# Patient Record
Sex: MALE | Race: WHITE | NOT HISPANIC OR LATINO | URBAN - METROPOLITAN AREA
[De-identification: names, ages, dates, MRNs, and addresses within clinical notes are randomized per-mention and may not be internally consistent; named-entity substitution may affect disease eponyms.]

---

## 2020-07-25 ENCOUNTER — TELEPHONE ENCOUNTER (OUTPATIENT)
Dept: URBAN - METROPOLITAN AREA CLINIC 13 | Facility: CLINIC | Age: 65
End: 2020-07-25

## 2020-07-26 ENCOUNTER — TELEPHONE ENCOUNTER (OUTPATIENT)
Dept: URBAN - METROPOLITAN AREA CLINIC 13 | Facility: CLINIC | Age: 65
End: 2020-07-26

## 2020-07-26 RX ORDER — PREDNISONE 20 MG/1
TABLET ORAL
Qty: 7 | Refills: 0 | Status: ACTIVE | COMMUNITY
Start: 2019-05-09

## 2020-07-26 RX ORDER — ADALIMUMAB 40MG/0.4ML
KIT SUBCUTANEOUS
Qty: 2 | Refills: 0 | Status: ACTIVE | COMMUNITY
Start: 2019-08-13

## 2020-07-26 RX ORDER — LISINOPRIL 10 MG/1
TAKE 1 TABLET DAILY FOR BLOOD PRESSURE TABLET ORAL
Refills: 0 | Status: ACTIVE | COMMUNITY

## 2020-07-26 RX ORDER — ADALIMUMAB 40MG/0.8ML
KIT SUBCUTANEOUS
Qty: 6 | Refills: 0 | Status: ACTIVE | COMMUNITY
Start: 2019-03-19

## 2020-07-26 RX ORDER — CIPROFLOXACIN AND DEXAMETHASONE 3; 1 MG/ML; MG/ML
SUSPENSION/ DROPS AURICULAR (OTIC)
Qty: 8 | Refills: 0 | Status: ACTIVE | COMMUNITY
Start: 2018-06-15

## 2020-07-26 RX ORDER — ADALIMUMAB 40MG/0.8ML
INJECT 40 MG SUBCUTANEOUSLY EVERY OTHER WEEK AS DIRECTED KIT SUBCUTANEOUS
Refills: 6 | Status: ACTIVE | COMMUNITY

## 2020-07-26 RX ORDER — CLOTRIMAZOLE 1 G/ML
SOLUTION TOPICAL
Qty: 30 | Refills: 0 | Status: ACTIVE | COMMUNITY
Start: 2019-08-26

## 2020-07-26 RX ORDER — METHOTREXATE 2.5 MG/1
TAKE 7 TABLETS PER WEEK TABLET ORAL
Refills: 0 | Status: ACTIVE | COMMUNITY

## 2020-07-26 RX ORDER — URSODIOL 300 MG/1
TAKE 1 CAPSULE 2-3 TIMES   DAILY CAPSULE ORAL
Qty: 90 | Refills: 1 | Status: ACTIVE | COMMUNITY
Start: 2019-09-23

## 2020-07-26 RX ORDER — ADALIMUMAB 40MG/0.8ML
KIT SUBCUTANEOUS
Qty: 6 | Refills: 0 | Status: ACTIVE | COMMUNITY
Start: 2018-11-25

## 2020-07-26 RX ORDER — MELOXICAM 15 MG/1
TAKE 1 TABLET DAILY AS NEEDED TABLET ORAL
Refills: 0 | Status: ACTIVE | COMMUNITY

## 2020-07-26 RX ORDER — FOLIC ACID 1 MG/1
TAKE 1 TABLET DAILY AS DIRECTED TABLET ORAL
Qty: 90 | Refills: 3 | Status: ACTIVE | COMMUNITY

## 2020-09-14 ENCOUNTER — ERX REFILL RESPONSE (OUTPATIENT)
Age: 65
End: 2020-09-14

## 2020-09-14 RX ORDER — URSODIOL 300 MG/1
TAKE 1 CAPSULE 2-3 TIMES   DAILY CAPSULE ORAL
Qty: 90 | Refills: 1

## 2020-11-07 ENCOUNTER — ERX REFILL RESPONSE (OUTPATIENT)
Age: 65
End: 2020-11-07

## 2020-11-07 RX ORDER — URSODIOL 300 MG/1
TAKE 1 CAPSULE 2-3 TIMES   DAILY CAPSULE ORAL
Qty: 90 | Refills: 1

## 2021-01-06 ENCOUNTER — ERX REFILL RESPONSE (OUTPATIENT)
Age: 66
End: 2021-01-06

## 2021-01-06 RX ORDER — URSODIOL 300 MG/1
TAKE 1 CAPSULE 2-3 TIMES   DAILY CAPSULE ORAL
Qty: 90 | Refills: 1

## 2021-06-26 ENCOUNTER — ERX REFILL RESPONSE (OUTPATIENT)
Dept: URBAN - METROPOLITAN AREA CLINIC 107 | Facility: CLINIC | Age: 66
End: 2021-06-26

## 2021-06-26 RX ORDER — URSODIOL 300 MG/1
TAKE 1 CAPSULE 2-3 TIMES   DAILY CAPSULE ORAL
Qty: 90 CAPSULE | Refills: 2 | OUTPATIENT

## 2021-08-06 ENCOUNTER — ERX REFILL RESPONSE (OUTPATIENT)
Dept: URBAN - METROPOLITAN AREA CLINIC 107 | Facility: CLINIC | Age: 66
End: 2021-08-06

## 2021-08-06 RX ORDER — URSODIOL 300 MG/1
TAKE 1 CAPSULE 2-3 TIMES   DAILY CAPSULE ORAL
Qty: 90 CAPSULE | Refills: 2 | OUTPATIENT

## 2021-09-20 ENCOUNTER — ERX REFILL RESPONSE (OUTPATIENT)
Dept: URBAN - METROPOLITAN AREA CLINIC 107 | Facility: CLINIC | Age: 66
End: 2021-09-20

## 2021-09-20 RX ORDER — URSODIOL 300 MG/1
TAKE 1 CAPSULE 2-3 TIMES   DAILY CAPSULE ORAL
Qty: 90 CAPSULE | Refills: 2 | OUTPATIENT

## 2021-11-08 ENCOUNTER — ERX REFILL RESPONSE (OUTPATIENT)
Dept: URBAN - METROPOLITAN AREA CLINIC 107 | Facility: CLINIC | Age: 66
End: 2021-11-08

## 2021-11-08 RX ORDER — URSODIOL 300 MG/1
TAKE 1 CAPSULE 2-3 TIMES   DAILY CAPSULE ORAL
Qty: 90 CAPSULE | Refills: 2 | OUTPATIENT

## 2021-12-22 ENCOUNTER — ERX REFILL RESPONSE (OUTPATIENT)
Dept: URBAN - METROPOLITAN AREA CLINIC 107 | Facility: CLINIC | Age: 66
End: 2021-12-22

## 2021-12-22 RX ORDER — URSODIOL 300 MG/1
TAKE 1 CAPSULE 2-3 TIMES   DAILY CAPSULE ORAL
Qty: 90 CAPSULE | Refills: 2 | OUTPATIENT

## 2022-06-13 ENCOUNTER — ERX REFILL RESPONSE (OUTPATIENT)
Dept: URBAN - METROPOLITAN AREA CLINIC 107 | Facility: CLINIC | Age: 67
End: 2022-06-13

## 2022-06-13 RX ORDER — URSODIOL 300 MG/1
TAKE 1 CAPSULE 2-3 TIMES   DAILY CAPSULE ORAL
Qty: 90 CAPSULE | Refills: 1 | OUTPATIENT

## 2022-06-13 RX ORDER — URSODIOL 300 MG/1
TAKE 1 CAPSULE 2-3 TIMES   DAILY CAPSULE ORAL
Qty: 90 CAPSULE | Refills: 2 | OUTPATIENT

## 2022-08-29 ENCOUNTER — ERX REFILL RESPONSE (OUTPATIENT)
Dept: URBAN - METROPOLITAN AREA CLINIC 107 | Facility: CLINIC | Age: 67
End: 2022-08-29

## 2022-08-29 RX ORDER — URSODIOL 300 MG/1
TAKE 1 CAPSULE 2-3 TIMES   DAILY CAPSULE ORAL
Qty: 90 CAPSULE | Refills: 1 | OUTPATIENT

## 2022-10-17 ENCOUNTER — ERX REFILL RESPONSE (OUTPATIENT)
Dept: URBAN - METROPOLITAN AREA CLINIC 107 | Facility: CLINIC | Age: 67
End: 2022-10-17

## 2022-10-17 RX ORDER — URSODIOL 300 MG/1
TAKE 1 CAPSULE 2-3 TIMES   DAILY CAPSULE ORAL
Qty: 90 CAPSULE | Refills: 1 | OUTPATIENT

## 2022-10-25 ENCOUNTER — WEB ENCOUNTER (OUTPATIENT)
Dept: URBAN - METROPOLITAN AREA CLINIC 113 | Facility: CLINIC | Age: 67
End: 2022-10-25

## 2022-10-28 ENCOUNTER — OFFICE VISIT (OUTPATIENT)
Dept: URBAN - METROPOLITAN AREA CLINIC 113 | Facility: CLINIC | Age: 67
End: 2022-10-28
Payer: MEDICARE

## 2022-10-28 ENCOUNTER — LAB OUTSIDE AN ENCOUNTER (OUTPATIENT)
Dept: URBAN - METROPOLITAN AREA CLINIC 113 | Facility: CLINIC | Age: 67
End: 2022-10-28

## 2022-10-28 VITALS
SYSTOLIC BLOOD PRESSURE: 143 MMHG | HEART RATE: 71 BPM | WEIGHT: 171 LBS | BODY MASS INDEX: 21.26 KG/M2 | DIASTOLIC BLOOD PRESSURE: 88 MMHG | TEMPERATURE: 97.3 F | RESPIRATION RATE: 20 BRPM | HEIGHT: 75 IN

## 2022-10-28 DIAGNOSIS — Z86.010 HISTORY OF ADENOMATOUS POLYP OF COLON: ICD-10-CM

## 2022-10-28 DIAGNOSIS — K80.20 CALCULUS OF GALLBLADDER WITHOUT CHOLECYSTITIS WITHOUT OBSTRUCTION: ICD-10-CM

## 2022-10-28 DIAGNOSIS — Z86.19 HISTORY OF HEPATITIS C: ICD-10-CM

## 2022-10-28 PROBLEM — 70342003: Status: ACTIVE | Noted: 2022-10-28

## 2022-10-28 PROCEDURE — 99213 OFFICE O/P EST LOW 20 MIN: CPT | Performed by: NURSE PRACTITIONER

## 2022-10-28 RX ORDER — URSODIOL 300 MG/1
TAKE 1 CAPSULE 2-3 TIMES   DAILY CAPSULE ORAL
Qty: 90 CAPSULE | Refills: 1 | Status: ACTIVE | COMMUNITY

## 2022-10-28 RX ORDER — LISINOPRIL 10 MG/1
TAKE 1 TABLET DAILY FOR BLOOD PRESSURE TABLET ORAL
Refills: 0 | Status: ACTIVE | COMMUNITY

## 2022-10-28 RX ORDER — CLOTRIMAZOLE 1 G/ML
SOLUTION TOPICAL
Qty: 30 | Refills: 0 | Status: DISCONTINUED | COMMUNITY
Start: 2019-08-26

## 2022-10-28 RX ORDER — ADALIMUMAB 40MG/0.4ML
KIT SUBCUTANEOUS
Qty: 2 | Refills: 0 | Status: DISCONTINUED | COMMUNITY
Start: 2019-08-13

## 2022-10-28 RX ORDER — METHOTREXATE 2.5 MG/1
TAKE 7 TABLETS PER WEEK TABLET ORAL
Refills: 0 | Status: ACTIVE | COMMUNITY

## 2022-10-28 RX ORDER — ADALIMUMAB 40MG/0.8ML
KIT SUBCUTANEOUS
Qty: 6 | Refills: 0 | Status: DISCONTINUED | COMMUNITY
Start: 2018-11-25

## 2022-10-28 RX ORDER — LATANOPROST 50 UG/ML
1 DROP INTO AFFECTED EYE IN THE EVENING SOLUTION/ DROPS OPHTHALMIC ONCE A DAY
Status: ACTIVE | COMMUNITY

## 2022-10-28 RX ORDER — MELOXICAM 15 MG/1
TAKE 1 TABLET DAILY AS NEEDED TABLET ORAL
Refills: 0 | Status: ACTIVE | COMMUNITY

## 2022-10-28 RX ORDER — INFLIXIMAB 100 MG/1
AS DIRECTED INJECTION, POWDER, LYOPHILIZED, FOR SOLUTION INTRAVENOUS
Status: ACTIVE | COMMUNITY

## 2022-10-28 RX ORDER — FOLIC ACID 1 MG/1
TAKE 1 TABLET DAILY AS DIRECTED TABLET ORAL
Qty: 90 | Refills: 3 | Status: ACTIVE | COMMUNITY

## 2022-10-28 RX ORDER — URSODIOL 300 MG/1
TAKE 1 CAPSULE 2-3 TIMES   DAILY CAPSULE ORAL
Qty: 90 | Refills: 1 | Status: DISCONTINUED | COMMUNITY
Start: 2019-09-23

## 2022-10-28 RX ORDER — BRIMONIDINE TARTRATE, TIMOLOL MALEATE 2; 5 MG/ML; MG/ML
1 DROP INTO AFFECTED EYE SOLUTION/ DROPS OPHTHALMIC TWICE A DAY
Status: ACTIVE | COMMUNITY

## 2022-10-28 RX ORDER — ADALIMUMAB 40MG/0.8ML
INJECT 40 MG SUBCUTANEOUSLY EVERY OTHER WEEK AS DIRECTED KIT SUBCUTANEOUS
Refills: 6 | Status: DISCONTINUED | COMMUNITY

## 2022-10-28 RX ORDER — PREDNISONE 20 MG/1
TABLET ORAL
Qty: 7 | Refills: 0 | Status: DISCONTINUED | COMMUNITY
Start: 2019-05-09

## 2022-10-28 RX ORDER — CIPROFLOXACIN AND DEXAMETHASONE 3; 1 MG/ML; MG/ML
SUSPENSION/ DROPS AURICULAR (OTIC)
Qty: 8 | Refills: 0 | Status: DISCONTINUED | COMMUNITY
Start: 2018-06-15

## 2022-10-28 RX ORDER — ADALIMUMAB 10MG/0.2ML
INJECT 0.2 MILLILITER BY SUBCUTANEOUS ROUTE KIT SUBCUTANEOUS
Qty: 1 | Refills: 0 | Status: DISCONTINUED | COMMUNITY
Start: 1900-01-01

## 2022-10-28 NOTE — HPI-TODAY'S VISIT:
This is a 67-year-old male with history of hypertension, psoriasis/psoriatic arthritis on methotrexate and Renflexis, cholelithiasis on ursodiol, hepatitis C status post treatment with Sovaldi and ribavirin in 2015 with sustained viral response, and adenomatous colon polyps due surveillance presenting for follow-up regarding hepatitis C and to schedule a surveillance colonoscopy. He was last seen in the office 11/14/2019.  He had undergone labs in April of that year showing a normal CBC, CMP, alpha-fetoprotein (2.3), and negative hepatitis C viral RNA.  He was to continue ursodiol 300 mg twice a day.  He had an ultrasound in April 2020 showing mild hepatomegaly and cholelithiasis without sonographic evidence of acute cholecystitis. He was doing well.  He infrequently has diarrhea but last less than a day.  He denies any other abdominal symptoms.

## 2022-10-28 NOTE — HPI-OTHER HISTORIES
Labs 10/17/2022:CBC: WBC 6.4, hemoglobin 13.3, MCV 99, platelet 248.  BMP normal.  LFTs normal TB 0.3, ALP 47, ALT 13, AST 18.  Hemoglobin A1c 5.6.  Cholesterol panel normal.

## 2022-10-31 LAB — AFP, SERUM, TUMOR MARKER: 3.3

## 2022-11-24 ENCOUNTER — ERX REFILL RESPONSE (OUTPATIENT)
Dept: URBAN - METROPOLITAN AREA CLINIC 107 | Facility: CLINIC | Age: 67
End: 2022-11-24

## 2022-11-24 RX ORDER — URSODIOL 300 MG/1
TAKE 1 CAPSULE 2-3 TIMES   DAILY CAPSULE ORAL
Qty: 90 CAPSULE | Refills: 1 | OUTPATIENT

## 2022-12-29 ENCOUNTER — CLAIMS CREATED FROM THE CLAIM WINDOW (OUTPATIENT)
Dept: URBAN - METROPOLITAN AREA CLINIC 4 | Facility: CLINIC | Age: 67
End: 2022-12-29
Payer: MEDICARE

## 2022-12-29 ENCOUNTER — OFFICE VISIT (OUTPATIENT)
Dept: URBAN - METROPOLITAN AREA SURGERY CENTER 25 | Facility: SURGERY CENTER | Age: 67
End: 2022-12-29
Payer: MEDICARE

## 2022-12-29 DIAGNOSIS — Z86.010 HISTORY OF ADENOMATOUS POLYP OF COLON: ICD-10-CM

## 2022-12-29 DIAGNOSIS — D12.3 ADENOMA OF TRANSVERSE COLON: ICD-10-CM

## 2022-12-29 DIAGNOSIS — D12.3 BENIGN NEOPLASM OF TRANSVERSE COLON: ICD-10-CM

## 2022-12-29 PROCEDURE — 88305 TISSUE EXAM BY PATHOLOGIST: CPT | Performed by: PATHOLOGY

## 2022-12-29 PROCEDURE — 45385 COLONOSCOPY W/LESION REMOVAL: CPT | Performed by: INTERNAL MEDICINE

## 2022-12-29 PROCEDURE — G8907 PT DOC NO EVENTS ON DISCHARG: HCPCS | Performed by: INTERNAL MEDICINE

## 2022-12-29 RX ORDER — MELOXICAM 15 MG/1
TAKE 1 TABLET DAILY AS NEEDED TABLET ORAL
Refills: 0 | Status: ACTIVE | COMMUNITY

## 2022-12-29 RX ORDER — LATANOPROST 50 UG/ML
1 DROP INTO AFFECTED EYE IN THE EVENING SOLUTION/ DROPS OPHTHALMIC ONCE A DAY
Status: ACTIVE | COMMUNITY

## 2022-12-29 RX ORDER — BRIMONIDINE TARTRATE, TIMOLOL MALEATE 2; 5 MG/ML; MG/ML
1 DROP INTO AFFECTED EYE SOLUTION/ DROPS OPHTHALMIC TWICE A DAY
Status: ACTIVE | COMMUNITY

## 2022-12-29 RX ORDER — METHOTREXATE 2.5 MG/1
TAKE 7 TABLETS PER WEEK TABLET ORAL
Refills: 0 | Status: ACTIVE | COMMUNITY

## 2022-12-29 RX ORDER — URSODIOL 300 MG/1
TAKE 1 CAPSULE 2-3 TIMES   DAILY CAPSULE ORAL
Qty: 90 CAPSULE | Refills: 1 | Status: ACTIVE | COMMUNITY

## 2022-12-29 RX ORDER — FOLIC ACID 1 MG/1
TAKE 1 TABLET DAILY AS DIRECTED TABLET ORAL
Qty: 90 | Refills: 3 | Status: ACTIVE | COMMUNITY

## 2022-12-29 RX ORDER — LISINOPRIL 10 MG/1
TAKE 1 TABLET DAILY FOR BLOOD PRESSURE TABLET ORAL
Refills: 0 | Status: ACTIVE | COMMUNITY

## 2022-12-29 RX ORDER — INFLIXIMAB 100 MG/1
AS DIRECTED INJECTION, POWDER, LYOPHILIZED, FOR SOLUTION INTRAVENOUS
Status: ACTIVE | COMMUNITY

## 2023-01-21 ENCOUNTER — ERX REFILL RESPONSE (OUTPATIENT)
Dept: URBAN - METROPOLITAN AREA CLINIC 107 | Facility: CLINIC | Age: 68
End: 2023-01-21

## 2023-01-21 RX ORDER — URSODIOL 300 MG/1
TAKE 1 CAPSULE 2-3 TIMES   DAILY CAPSULE ORAL
Qty: 90 CAPSULE | Refills: 1 | OUTPATIENT

## 2023-01-23 PROBLEM — 429047008: Status: ACTIVE | Noted: 2022-10-28

## 2023-06-29 ENCOUNTER — ERX REFILL RESPONSE (OUTPATIENT)
Dept: URBAN - METROPOLITAN AREA CLINIC 107 | Facility: CLINIC | Age: 68
End: 2023-06-29

## 2023-06-29 RX ORDER — URSODIOL 300 MG/1
TAKE 1 CAPSULE 2-3 TIMES   DAILY CAPSULE ORAL
Qty: 90 CAPSULE | Refills: 1 | OUTPATIENT

## 2023-08-16 ENCOUNTER — ERX REFILL RESPONSE (OUTPATIENT)
Dept: URBAN - METROPOLITAN AREA CLINIC 107 | Facility: CLINIC | Age: 68
End: 2023-08-16

## 2023-08-16 RX ORDER — URSODIOL 300 MG/1
TAKE 1 CAPSULE 2-3 TIMES   DAILY CAPSULE ORAL
Qty: 90 CAPSULE | Refills: 1 | OUTPATIENT

## 2023-09-28 ENCOUNTER — OFFICE VISIT (OUTPATIENT)
Dept: URBAN - METROPOLITAN AREA CLINIC 113 | Facility: CLINIC | Age: 68
End: 2023-09-28

## 2023-10-08 ENCOUNTER — ERX REFILL RESPONSE (OUTPATIENT)
Dept: URBAN - METROPOLITAN AREA CLINIC 107 | Facility: CLINIC | Age: 68
End: 2023-10-08

## 2023-10-08 RX ORDER — URSODIOL 300 MG/1
TAKE 1 CAPSULE 2-3 TIMES   DAILY CAPSULE ORAL
Qty: 90 CAPSULE | Refills: 1 | OUTPATIENT

## 2023-10-09 ENCOUNTER — DASHBOARD ENCOUNTERS (OUTPATIENT)
Age: 68
End: 2023-10-09

## 2023-10-09 ENCOUNTER — OFFICE VISIT (OUTPATIENT)
Dept: URBAN - METROPOLITAN AREA CLINIC 113 | Facility: CLINIC | Age: 68
End: 2023-10-09
Payer: MEDICARE

## 2023-10-09 VITALS
DIASTOLIC BLOOD PRESSURE: 87 MMHG | TEMPERATURE: 97.7 F | HEIGHT: 75 IN | SYSTOLIC BLOOD PRESSURE: 150 MMHG | BODY MASS INDEX: 20.71 KG/M2 | WEIGHT: 166.6 LBS | HEART RATE: 75 BPM

## 2023-10-09 DIAGNOSIS — Z86.010 HISTORY OF ADENOMATOUS POLYP OF COLON: ICD-10-CM

## 2023-10-09 DIAGNOSIS — Z86.19 HISTORY OF HEPATITIS C: ICD-10-CM

## 2023-10-09 DIAGNOSIS — K80.20 CALCULUS OF GALLBLADDER WITHOUT CHOLECYSTITIS WITHOUT OBSTRUCTION: ICD-10-CM

## 2023-10-09 PROCEDURE — 99213 OFFICE O/P EST LOW 20 MIN: CPT | Performed by: INTERNAL MEDICINE

## 2023-10-09 RX ORDER — FOLIC ACID 1 MG/1
TAKE 1 TABLET DAILY AS DIRECTED TABLET ORAL
Qty: 90 | Refills: 3 | Status: ACTIVE | COMMUNITY

## 2023-10-09 RX ORDER — MELOXICAM 15 MG/1
TAKE 1 TABLET DAILY AS NEEDED TABLET ORAL
Refills: 0 | Status: ACTIVE | COMMUNITY

## 2023-10-09 RX ORDER — LATANOPROST 50 UG/ML
1 DROP INTO AFFECTED EYE IN THE EVENING SOLUTION/ DROPS OPHTHALMIC ONCE A DAY
Status: ACTIVE | COMMUNITY

## 2023-10-09 RX ORDER — LISINOPRIL 10 MG/1
TAKE 1 TABLET DAILY FOR BLOOD PRESSURE TABLET ORAL
Refills: 0 | Status: ACTIVE | COMMUNITY

## 2023-10-09 RX ORDER — METHOTREXATE 2.5 MG/1
TAKE 7 TABLETS PER WEEK TABLET ORAL
Refills: 0 | Status: ACTIVE | COMMUNITY

## 2023-10-09 RX ORDER — URSODIOL 300 MG/1
TAKE 1 CAPSULE 2-3 TIMES   DAILY CAPSULE ORAL
Qty: 90 CAPSULE | Refills: 1 | Status: ACTIVE | COMMUNITY

## 2023-10-09 RX ORDER — BRIMONIDINE TARTRATE, TIMOLOL MALEATE 2; 5 MG/ML; MG/ML
1 DROP INTO AFFECTED EYE SOLUTION/ DROPS OPHTHALMIC TWICE A DAY
Status: ACTIVE | COMMUNITY

## 2023-10-09 RX ORDER — INFLIXIMAB 100 MG/1
AS DIRECTED INJECTION, POWDER, LYOPHILIZED, FOR SOLUTION INTRAVENOUS
Status: ACTIVE | COMMUNITY

## 2023-10-09 NOTE — HPI-TODAY'S VISIT:
This is a 68-year-old male with history of hypertension, psoriasis/psoriatic arthritis on methotrexate and Renflexis, cholelithiasis on ursodiol, hepatitis C status post treatment with Sovaldi and ribavirin in 2015 with sustained viral response, and adenomatous colon polyps presenting for follow-up . He was last seen here on 10/28/22 regarding hepatitis C and to schedule a surveillance colonoscopy. Labs 10/17/2022:CBC: WBC 6.4, hemoglobin 13.3, MCV 99, platelet 248. BMP normal. LFTs normal TB 0.3, ALP 47, ALT 13, AST 18. Hemoglobin A1c 5.6. Cholesterol panel normal.  An AFP done at that time (on 10/28/22) was normal at 3.3.  He was previously seen in the office 11/14/2019.  He had undergone labs in April of that year showing a normal CBC, CMP, alpha-fetoprotein (2.3), and negative hepatitis C viral RNA.  He was to continue ursodiol 300 mg twice a day.  He had an ultrasound in April 2020 showing mild hepatomegaly and cholelithiasis without sonographic evidence of acute cholecystitis.  He was doing well overall.  The patient underwent colonoscopic examination on December 29, 2022.  This examination showed a fair bowel preparation, 2 polyps in the transverse colon measuring between 4 and 5 mm which were removed and grade 1 internal hemorrhoids.  The polyps were histologically tubular adenomas.The patient returns today with no follow-up complaints.  He feels well.

## 2023-10-10 ENCOUNTER — OFFICE VISIT (OUTPATIENT)
Dept: URBAN - METROPOLITAN AREA CLINIC 113 | Facility: CLINIC | Age: 68
End: 2023-10-10

## 2024-05-16 ENCOUNTER — ERX REFILL RESPONSE (OUTPATIENT)
Dept: URBAN - METROPOLITAN AREA CLINIC 107 | Facility: CLINIC | Age: 69
End: 2024-05-16

## 2024-05-16 RX ORDER — URSODIOL 300 MG/1
TAKE 1 CAPSULE 2 TO 3 TIMESDAILY CAPSULE ORAL
Qty: 90 CAPSULE | Refills: 1 | OUTPATIENT

## 2024-08-16 ENCOUNTER — ERX REFILL RESPONSE (OUTPATIENT)
Dept: URBAN - METROPOLITAN AREA CLINIC 107 | Facility: CLINIC | Age: 69
End: 2024-08-16

## 2024-08-16 RX ORDER — URSODIOL 300 MG/1
TAKE 1 CAPSULE 2 TO 3 TIMESDAILY CAPSULE ORAL
Qty: 90 CAPSULE | Refills: 1 | OUTPATIENT

## 2024-12-13 ENCOUNTER — ERX REFILL RESPONSE (OUTPATIENT)
Dept: URBAN - METROPOLITAN AREA CLINIC 107 | Facility: CLINIC | Age: 69
End: 2024-12-13

## 2024-12-13 RX ORDER — URSODIOL 300 MG/1
TAKE 1 CAPSULE 2 TO 3 TIMESDAILY CAPSULE ORAL
Qty: 90 CAPSULE | Refills: 1 | OUTPATIENT

## 2025-02-10 ENCOUNTER — ERX REFILL RESPONSE (OUTPATIENT)
Dept: URBAN - METROPOLITAN AREA CLINIC 107 | Facility: CLINIC | Age: 70
End: 2025-02-10

## 2025-02-10 RX ORDER — URSODIOL 300 MG/1
TAKE 1 CAPSULE 2 TO 3 TIMESDAILY CAPSULE ORAL
Qty: 90 CAPSULE | Refills: 1 | OUTPATIENT

## 2025-07-15 ENCOUNTER — ERX REFILL RESPONSE (OUTPATIENT)
Dept: URBAN - METROPOLITAN AREA CLINIC 107 | Facility: CLINIC | Age: 70
End: 2025-07-15

## 2025-07-15 RX ORDER — URSODIOL 300 MG/1
TAKE 1 CAPSULE 2 TO 3 TIMESDAILY CAPSULE ORAL
Qty: 90 CAPSULE | Refills: 1